# Patient Record
Sex: FEMALE | Race: WHITE | NOT HISPANIC OR LATINO | Employment: PART TIME | URBAN - METROPOLITAN AREA
[De-identification: names, ages, dates, MRNs, and addresses within clinical notes are randomized per-mention and may not be internally consistent; named-entity substitution may affect disease eponyms.]

---

## 2023-09-21 ENCOUNTER — INITIAL PRENATAL (OUTPATIENT)
Dept: OBSTETRICS AND GYNECOLOGY | Facility: CLINIC | Age: 28
End: 2023-09-21
Payer: COMMERCIAL

## 2023-09-21 VITALS — WEIGHT: 241 LBS | DIASTOLIC BLOOD PRESSURE: 62 MMHG | SYSTOLIC BLOOD PRESSURE: 124 MMHG

## 2023-09-21 DIAGNOSIS — Z3A.01 5 WEEKS GESTATION OF PREGNANCY: ICD-10-CM

## 2023-09-21 DIAGNOSIS — Z34.91 INITIAL OBSTETRIC VISIT IN FIRST TRIMESTER: Primary | ICD-10-CM

## 2023-09-21 LAB
B-HCG UR QL: POSITIVE
EXPIRATION DATE: ABNORMAL
GLUCOSE UR STRIP-MCNC: NEGATIVE MG/DL
INTERNAL NEGATIVE CONTROL: ABNORMAL
INTERNAL POSITIVE CONTROL: ABNORMAL
Lab: ABNORMAL
PROT UR STRIP-MCNC: NEGATIVE MG/DL

## 2023-09-21 RX ORDER — PRENATAL VIT/IRON FUM/FOLIC AC 27MG-0.8MG
1 TABLET ORAL DAILY
COMMUNITY

## 2023-09-22 ENCOUNTER — TELEPHONE (OUTPATIENT)
Dept: OBSTETRICS AND GYNECOLOGY | Facility: CLINIC | Age: 28
End: 2023-09-22
Payer: COMMERCIAL

## 2023-09-22 LAB
ABO GROUP BLD: NORMAL
AMPHETAMINES UR QL SCN: NEGATIVE NG/ML
BARBITURATES UR QL SCN: NEGATIVE NG/ML
BASOPHILS # BLD AUTO: 0.1 X10E3/UL (ref 0–0.2)
BASOPHILS NFR BLD AUTO: 1 %
BENZODIAZ UR QL: NEGATIVE NG/ML
BLD GP AB SCN SERPL QL: NEGATIVE
BZE UR QL: NEGATIVE NG/ML
CANNABINOIDS UR QL SCN: NEGATIVE NG/ML
EOSINOPHIL # BLD AUTO: 0.1 X10E3/UL (ref 0–0.4)
EOSINOPHIL NFR BLD AUTO: 2 %
ERYTHROCYTE [DISTWIDTH] IN BLOOD BY AUTOMATED COUNT: 11.9 % (ref 11.7–15.4)
HBV SURFACE AG SERPL QL IA: NEGATIVE
HCG INTACT+B SERPL-ACNC: 1161 MIU/ML
HCT VFR BLD AUTO: 40.7 % (ref 34–46.6)
HCV IGG SERPL QL IA: NON REACTIVE
HGB BLD-MCNC: 13.3 G/DL (ref 11.1–15.9)
HIV 1+2 AB+HIV1 P24 AG SERPL QL IA: NON REACTIVE
IMM GRANULOCYTES # BLD AUTO: 0 X10E3/UL (ref 0–0.1)
IMM GRANULOCYTES NFR BLD AUTO: 0 %
LYMPHOCYTES # BLD AUTO: 1.8 X10E3/UL (ref 0.7–3.1)
LYMPHOCYTES NFR BLD AUTO: 20 %
MCH RBC QN AUTO: 30.5 PG (ref 26.6–33)
MCHC RBC AUTO-ENTMCNC: 32.7 G/DL (ref 31.5–35.7)
MCV RBC AUTO: 93 FL (ref 79–97)
METHADONE UR QL SCN: NEGATIVE NG/ML
MONOCYTES # BLD AUTO: 0.5 X10E3/UL (ref 0.1–0.9)
MONOCYTES NFR BLD AUTO: 5 %
NEUTROPHILS # BLD AUTO: 6.5 X10E3/UL (ref 1.4–7)
NEUTROPHILS NFR BLD AUTO: 72 %
OPIATES UR QL: NEGATIVE NG/ML
PCP UR QL: NEGATIVE NG/ML
PLATELET # BLD AUTO: 416 X10E3/UL (ref 150–450)
PROPOXYPH UR QL SCN: NEGATIVE NG/ML
RBC # BLD AUTO: 4.36 X10E6/UL (ref 3.77–5.28)
RH BLD: NEGATIVE
RPR SER QL: NON REACTIVE
RUBV IGG SERPL IA-ACNC: 5.41 INDEX
VZV IGG SER IA-ACNC: 672 INDEX
WBC # BLD AUTO: 9 X10E3/UL (ref 3.4–10.8)

## 2023-09-22 NOTE — TELEPHONE ENCOUNTER
Leola    Let her know that her hormone level was 1,100.  I would like her to do another hormone level on Monday and see how it is rising.  We will go over results the following day.    Thanks    Marita

## 2023-09-22 NOTE — TELEPHONE ENCOUNTER
Spoke with Joyce to let her know that Dr Kirkland said that your hormone level was 1,100. He would like you to do another hormone level on Monday and see how it is rising. We will go over the results on the following day. 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 States she will call back to schedule blood draw.

## 2023-09-23 LAB
BACTERIA UR CULT: NORMAL
BACTERIA UR CULT: NORMAL
C TRACH RRNA SPEC QL NAA+PROBE: NEGATIVE
N GONORRHOEA RRNA SPEC QL NAA+PROBE: NEGATIVE
T VAGINALIS RRNA SPEC QL NAA+PROBE: NEGATIVE

## 2023-09-23 NOTE — PROGRESS NOTES
Cc:  First visit for new pregnancy  Patient was generally attempting pregnancy.  She has no major complaints.  No bleeding or spotting.  No significant pain issues.  Patient reports that viability is not clear but there is question regarding how far along pregnancy is.  Past medical, surgical, obstetric, genetic, social and family histories reviewed by me.  Allergies and medications reviewed.  Physical exam documented in chart.  Prenatal labs ordered including hCG level.  A/P:  IUP at approximately 5 weeks  - Serial hCG levels recommended and then timing of sonogram for viability.  - Discussed set up of our practice, labs, nutrition and vitamins, and importance of vaccinations, including Covid.  Discussed risks of Covid in pregnancy in patients who have not been vaccinated.

## 2023-09-25 ENCOUNTER — TELEPHONE (OUTPATIENT)
Dept: OBSTETRICS AND GYNECOLOGY | Facility: CLINIC | Age: 28
End: 2023-09-25

## 2023-09-25 NOTE — TELEPHONE ENCOUNTER
Lucy Álvarez is normal.  If she is bleeding, that is more worrisome.  I cannot make any assessments until I have labs back tomorrow.    Dr Kirkland

## 2023-09-25 NOTE — TELEPHONE ENCOUNTER
Patient believes around 5 weeks pregnant. Came today to check HCG level. Since she left having sharp cramping . Said feels like pain you have when bladder is really full but its not. Pt Ph [phone]

## 2023-09-26 ENCOUNTER — TELEPHONE (OUTPATIENT)
Dept: OBSTETRICS AND GYNECOLOGY | Facility: CLINIC | Age: 28
End: 2023-09-26
Payer: COMMERCIAL

## 2023-09-26 LAB — HCG INTACT+B SERPL-ACNC: 3491 MIU/ML

## 2023-09-26 NOTE — TELEPHONE ENCOUNTER
Pt called states is taking a trip that is 8 hours and will be making frequent stops. Just wanted to make sure was safe to still take this trip. Also she is scheduled for u/s on Friday and wanted to know if she still needed to come in next Wednesday for repeat HCG draw.

## 2023-09-26 NOTE — TELEPHONE ENCOUNTER
Leola    Let her know that her hormone levels are rising appropriately.    She can come to office for ultrasound for viability on Friday or Monday next week.    Thanks    Marita

## 2023-09-26 NOTE — TELEPHONE ENCOUNTER
Nereida    She can take a long trip as long as she takes a break and gets out of the car every 2 to 3 hours.    She only needs to do sonogram at this point.    Thanks    Marita

## 2023-10-03 LAB
CITATION REF LAB TEST: NORMAL
GENDER IDENTITY: NORMAL
GENE DIS ANL CARRIER INTERP-IMP: NORMAL
GENE STUDIED ID: NORMAL
GENETIC SCN SPEC: NORMAL
LAB DIRECTOR NAME PROVIDER: NORMAL
Lab: NORMAL
REASON FOR REFERRAL (NARRATIVE): NORMAL
RECOMMENDATION PATIENT DOC-IMP: NORMAL
REF LAB TEST METHOD: NORMAL
SERVICE CMNT-IMP: NORMAL
SPECIMEN SOURCE: NORMAL

## 2023-10-09 ENCOUNTER — ROUTINE PRENATAL (OUTPATIENT)
Dept: OBSTETRICS AND GYNECOLOGY | Facility: CLINIC | Age: 28
End: 2023-10-09
Payer: COMMERCIAL

## 2023-10-09 VITALS — DIASTOLIC BLOOD PRESSURE: 72 MMHG | WEIGHT: 236 LBS | SYSTOLIC BLOOD PRESSURE: 106 MMHG

## 2023-10-09 DIAGNOSIS — Z3A.01 7 WEEKS GESTATION OF PREGNANCY: Primary | ICD-10-CM

## 2023-10-09 DIAGNOSIS — Z34.01 PRIMIGRAVIDA, FIRST TRIMESTER: ICD-10-CM

## 2023-10-09 DIAGNOSIS — O21.9 NAUSEA AND VOMITING IN PREGNANCY: ICD-10-CM

## 2023-10-09 LAB
EXPIRATION DATE: NORMAL
GLUCOSE UR STRIP-MCNC: NEGATIVE MG/DL
Lab: NORMAL
PROT UR STRIP-MCNC: NEGATIVE MG/DL

## 2023-10-09 RX ORDER — VITAMIN A ACETATE, BETA CAROTENE, ASCORBIC ACID, CHOLECALCIFEROL, .ALPHA.-TOCOPHEROL ACETATE, DL-, THIAMINE MONONITRATE, RIBOFLAVIN, NIACINAMIDE, PYRIDOXINE HYDROCHLORIDE, FOLIC ACID, CYANOCOBALAMIN, CALCIUM CARBONATE, FERROUS FUMARATE, ZINC OXIDE, CUPRIC OXIDE 3080; 12; 120; 400; 1; 1.84; 3; 20; 22; 920; 25; 200; 27; 10; 2 [IU]/1; UG/1; MG/1; [IU]/1; MG/1; MG/1; MG/1; MG/1; MG/1; [IU]/1; MG/1; MG/1; MG/1; MG/1; MG/1
1 TABLET, FILM COATED ORAL DAILY
COMMUNITY

## 2023-10-09 RX ORDER — PROMETHAZINE HYDROCHLORIDE 12.5 MG/1
12.5 TABLET ORAL EVERY 6 HOURS PRN
Qty: 30 TABLET | Refills: 1 | Status: SHIPPED | OUTPATIENT
Start: 2023-10-09

## 2023-10-09 RX ORDER — DOCUSATE SODIUM 100 MG/1
CAPSULE, LIQUID FILLED ORAL
COMMUNITY
Start: 2023-06-16

## 2023-10-09 NOTE — PROGRESS NOTES
Cc:  Pregnancy follow up.  Patient c/o not being able to keep most solids/liquids down. She is able to keep water down if she drinks very slow.  She has tried several OTC remedies including randell and Unisom/B complex.  No bleeding or spotting.  Vitals reviewed by me.  Ultrasound and labs reviewed with patient.  A/P:  IUP at 7 weeks with nausea in pregnancy.  - Start Promethazine next.  - Discussed maternal well being and normal symptoms of first trimester.

## 2023-10-11 ENCOUNTER — HOSPITAL ENCOUNTER (EMERGENCY)
Facility: HOSPITAL | Age: 28
Discharge: HOME | End: 2023-10-11
Attending: EMERGENCY MEDICINE | Admitting: EMERGENCY MEDICINE
Payer: COMMERCIAL

## 2023-10-11 ENCOUNTER — TELEPHONE (OUTPATIENT)
Dept: OBSTETRICS AND GYNECOLOGY | Facility: CLINIC | Age: 28
End: 2023-10-11

## 2023-10-11 VITALS
SYSTOLIC BLOOD PRESSURE: 111 MMHG | TEMPERATURE: 98.3 F | HEART RATE: 99 BPM | DIASTOLIC BLOOD PRESSURE: 70 MMHG | RESPIRATION RATE: 16 BRPM | HEIGHT: 62 IN | OXYGEN SATURATION: 98 % | BODY MASS INDEX: 43.24 KG/M2 | WEIGHT: 235 LBS

## 2023-10-11 DIAGNOSIS — O21.9 NAUSEA AND VOMITING DURING PREGNANCY: Primary | ICD-10-CM

## 2023-10-11 LAB
ALBUMIN SERPL-MCNC: 3.8 G/DL (ref 3.5–5.2)
ALBUMIN/GLOB SERPL: 1.2 G/DL
ALP SERPL-CCNC: 85 U/L (ref 39–117)
ALT SERPL W P-5'-P-CCNC: 14 U/L (ref 1–33)
ANION GAP SERPL CALCULATED.3IONS-SCNC: 11 MMOL/L (ref 5–15)
AST SERPL-CCNC: 14 U/L (ref 1–32)
BACTERIA UR QL AUTO: ABNORMAL /HPF
BASOPHILS # BLD AUTO: 0.04 10*3/MM3 (ref 0–0.2)
BASOPHILS NFR BLD AUTO: 0.4 % (ref 0–1.5)
BILIRUB SERPL-MCNC: 0.3 MG/DL (ref 0–1.2)
BILIRUB UR QL STRIP: NEGATIVE
BUN SERPL-MCNC: 7 MG/DL (ref 6–20)
BUN/CREAT SERPL: 10.3 (ref 7–25)
CALCIUM SPEC-SCNC: 9.2 MG/DL (ref 8.6–10.5)
CHLORIDE SERPL-SCNC: 106 MMOL/L (ref 98–107)
CLARITY UR: ABNORMAL
CO2 SERPL-SCNC: 22 MMOL/L (ref 22–29)
COLOR UR: YELLOW
CREAT SERPL-MCNC: 0.68 MG/DL (ref 0.57–1)
DEPRECATED RDW RBC AUTO: 38.1 FL (ref 37–54)
EGFRCR SERPLBLD CKD-EPI 2021: 123.4 ML/MIN/1.73
EOSINOPHIL # BLD AUTO: 0.12 10*3/MM3 (ref 0–0.4)
EOSINOPHIL NFR BLD AUTO: 1.3 % (ref 0.3–6.2)
ERYTHROCYTE [DISTWIDTH] IN BLOOD BY AUTOMATED COUNT: 11.7 % (ref 12.3–15.4)
GLOBULIN UR ELPH-MCNC: 3.3 GM/DL
GLUCOSE BLDC GLUCOMTR-MCNC: 95 MG/DL (ref 70–130)
GLUCOSE SERPL-MCNC: 81 MG/DL (ref 65–99)
GLUCOSE UR STRIP-MCNC: NEGATIVE MG/DL
HCT VFR BLD AUTO: 38.4 % (ref 34–46.6)
HGB BLD-MCNC: 13.1 G/DL (ref 12–15.9)
HGB UR QL STRIP.AUTO: NEGATIVE
HYALINE CASTS UR QL AUTO: ABNORMAL /LPF
IMM GRANULOCYTES # BLD AUTO: 0.05 10*3/MM3 (ref 0–0.05)
IMM GRANULOCYTES NFR BLD AUTO: 0.5 % (ref 0–0.5)
KETONES UR QL STRIP: NEGATIVE
LEUKOCYTE ESTERASE UR QL STRIP.AUTO: ABNORMAL
LYMPHOCYTES # BLD AUTO: 2.03 10*3/MM3 (ref 0.7–3.1)
LYMPHOCYTES NFR BLD AUTO: 21.4 % (ref 19.6–45.3)
MAGNESIUM SERPL-MCNC: 2 MG/DL (ref 1.6–2.6)
MCH RBC QN AUTO: 30.8 PG (ref 26.6–33)
MCHC RBC AUTO-ENTMCNC: 34.1 G/DL (ref 31.5–35.7)
MCV RBC AUTO: 90.4 FL (ref 79–97)
MONOCYTES # BLD AUTO: 0.64 10*3/MM3 (ref 0.1–0.9)
MONOCYTES NFR BLD AUTO: 6.8 % (ref 5–12)
NEUTROPHILS NFR BLD AUTO: 6.59 10*3/MM3 (ref 1.7–7)
NEUTROPHILS NFR BLD AUTO: 69.6 % (ref 42.7–76)
NITRITE UR QL STRIP: NEGATIVE
NRBC BLD AUTO-RTO: 0 /100 WBC (ref 0–0.2)
PH UR STRIP.AUTO: 8 [PH] (ref 5–8)
PLATELET # BLD AUTO: 393 10*3/MM3 (ref 140–450)
PMV BLD AUTO: 10.6 FL (ref 6–12)
POTASSIUM SERPL-SCNC: 3.9 MMOL/L (ref 3.5–5.2)
PROT SERPL-MCNC: 7.1 G/DL (ref 6–8.5)
PROT UR QL STRIP: NEGATIVE
RBC # BLD AUTO: 4.25 10*6/MM3 (ref 3.77–5.28)
RBC # UR STRIP: ABNORMAL /HPF
REF LAB TEST METHOD: ABNORMAL
SODIUM SERPL-SCNC: 139 MMOL/L (ref 136–145)
SP GR UR STRIP: 1.02 (ref 1–1.03)
SQUAMOUS #/AREA URNS HPF: ABNORMAL /HPF
UROBILINOGEN UR QL STRIP: ABNORMAL
WBC # UR STRIP: ABNORMAL /HPF
WBC NRBC COR # BLD: 9.47 10*3/MM3 (ref 3.4–10.8)

## 2023-10-11 PROCEDURE — 99283 EMERGENCY DEPT VISIT LOW MDM: CPT

## 2023-10-11 PROCEDURE — 85025 COMPLETE CBC W/AUTO DIFF WBC: CPT | Performed by: EMERGENCY MEDICINE

## 2023-10-11 PROCEDURE — 81001 URINALYSIS AUTO W/SCOPE: CPT | Performed by: EMERGENCY MEDICINE

## 2023-10-11 PROCEDURE — 80053 COMPREHEN METABOLIC PANEL: CPT | Performed by: EMERGENCY MEDICINE

## 2023-10-11 PROCEDURE — 83735 ASSAY OF MAGNESIUM: CPT | Performed by: EMERGENCY MEDICINE

## 2023-10-11 PROCEDURE — 25810000003 DEXTROSE-NACL PER 500 ML: Performed by: EMERGENCY MEDICINE

## 2023-10-11 PROCEDURE — 96374 THER/PROPH/DIAG INJ IV PUSH: CPT

## 2023-10-11 PROCEDURE — 82948 REAGENT STRIP/BLOOD GLUCOSE: CPT

## 2023-10-11 PROCEDURE — 96361 HYDRATE IV INFUSION ADD-ON: CPT

## 2023-10-11 PROCEDURE — 25010000002 ONDANSETRON PER 1 MG: Performed by: EMERGENCY MEDICINE

## 2023-10-11 RX ORDER — ONDANSETRON 4 MG/1
4 TABLET, ORALLY DISINTEGRATING ORAL 4 TIMES DAILY PRN
Qty: 12 TABLET | Refills: 0 | Status: SHIPPED | OUTPATIENT
Start: 2023-10-11

## 2023-10-11 RX ORDER — SODIUM CHLORIDE 0.9 % (FLUSH) 0.9 %
10 SYRINGE (ML) INJECTION AS NEEDED
Status: DISCONTINUED | OUTPATIENT
Start: 2023-10-11 | End: 2023-10-11 | Stop reason: HOSPADM

## 2023-10-11 RX ORDER — DEXTROSE AND SODIUM CHLORIDE 5; .9 G/100ML; G/100ML
500 INJECTION, SOLUTION INTRAVENOUS CONTINUOUS
Status: DISCONTINUED | OUTPATIENT
Start: 2023-10-11 | End: 2023-10-11 | Stop reason: HOSPADM

## 2023-10-11 RX ORDER — ONDANSETRON 2 MG/ML
4 INJECTION INTRAMUSCULAR; INTRAVENOUS ONCE
Status: COMPLETED | OUTPATIENT
Start: 2023-10-11 | End: 2023-10-11

## 2023-10-11 RX ADMIN — ONDANSETRON 4 MG: 2 INJECTION INTRAMUSCULAR; INTRAVENOUS at 11:39

## 2023-10-11 RX ADMIN — DEXTROSE AND SODIUM CHLORIDE 500 ML/HR: 5; 900 INJECTION, SOLUTION INTRAVENOUS at 11:43

## 2023-10-11 NOTE — DISCHARGE INSTRUCTIONS
Take nausea medicine as prescribed.  Call your OB doctor for an appointment to be seen for follow-up.  Return here meetly for any vaginal bleeding or worsened pain.  Return here immediately if you are not able to keep any fluids down.

## 2023-10-11 NOTE — ED PROVIDER NOTES
EMERGENCY DEPARTMENT ENCOUNTER    Room Number:  14/14  PCP: Provider, No Known  Patient Care Team:  Provider, No Known as PCP - General   Independent Historians: Patient    HPI:  Chief Complaint: Nausea and vomiting in pregnancy    A complete HPI/ROS/PMH/PSH/SH/FH are unobtainable due to: Nothing    Chronic or social conditions impacting patient care (Social Determinants of Health): None  (Financial Resource Strain / Food Insecurity / Transportation Needs / Physical Activity / Stress / Social Connections / Intimate Partner Violence / Housing Stability)    Context: Joyce Carrillo is a 28 y.o. female who presents to the ED c/o acute nausea and vomiting in pregnancy.  The patient reports that she is almost 3 weeks pregnant.  She reports this is her first pregnancy.  She reports she has had persistent nausea and vomiting throughout pregnancy.  She states she saw her OB doctor 2 days ago and they started her on Phenergan.  She states that 20 minutes after taking Phenergan she vomits and cannot keep anything down.  She states that prior to the Phenergan she had used over-the-counter remedies without improvement.  She denies any vaginal bleeding.  She reports she has had cramping throughout the pregnancy which is unchanged and she let her OB doctor know about that on Monday.  She called the office this morning and was directed to the emergency room for further evaluation.    Review of prior external notes (non-ED) -and- Review of prior external test results outside of this encounter: OB note dated 10/9/2023 notes vomiting and to start promethazine.     Prescription drug monitoring program review:         PAST MEDICAL HISTORY  Active Ambulatory Problems     Diagnosis Date Noted    No Active Ambulatory Problems     Resolved Ambulatory Problems     Diagnosis Date Noted    No Resolved Ambulatory Problems     No Additional Past Medical History         PAST SURGICAL HISTORY  No past surgical history on file.      FAMILY  HISTORY  No family history on file.      SOCIAL HISTORY  Social History     Socioeconomic History    Marital status:    Substance and Sexual Activity    Sexual activity: Yes     Partners: Male     Birth control/protection: None         ALLERGIES  Wellbutrin [bupropion] and Celecoxib        REVIEW OF SYSTEMS  Review of Systems  Included in HPI  All systems reviewed and negative except for those discussed in HPI.      PHYSICAL EXAM    I have reviewed the triage vital signs and nursing notes.    ED Triage Vitals   Temp Heart Rate Resp BP SpO2   10/11/23 1046 10/11/23 1046 10/11/23 1048 10/11/23 1048 10/11/23 1046   98.3 °F (36.8 °C) 99 16 111/70 98 %      Temp src Heart Rate Source Patient Position BP Location FiO2 (%)   -- -- -- -- --              Physical Exam  GENERAL: Awake, alert, no acute distress  SKIN: Warm, dry  HENT: Normocephalic, atraumatic  EYES: no scleral icterus  CV: regular rhythm, regular rate  RESPIRATORY: normal effort, lungs clear  ABDOMEN: soft, nontender, nondistended  MUSCULOSKELETAL: no deformity  NEURO: alert, moves all extremities, follows commands                                                               LAB RESULTS  Recent Results (from the past 24 hour(s))   Urinalysis With Microscopic If Indicated (No Culture) - Urine, Clean Catch    Collection Time: 10/11/23 11:30 AM    Specimen: Urine, Clean Catch   Result Value Ref Range    Color, UA Yellow Yellow, Straw    Appearance, UA Turbid (A) Clear    pH, UA 8.0 5.0 - 8.0    Specific Gravity, UA 1.021 1.005 - 1.030    Glucose, UA Negative Negative    Ketones, UA Negative Negative    Bilirubin, UA Negative Negative    Blood, UA Negative Negative    Protein, UA Negative Negative    Leuk Esterase, UA Trace (A) Negative    Nitrite, UA Negative Negative    Urobilinogen, UA 1.0 E.U./dL 0.2 - 1.0 E.U./dL   POC Glucose Once    Collection Time: 10/11/23 11:31 AM    Specimen: Blood   Result Value Ref Range    Glucose 95 70 - 130 mg/dL    Comprehensive Metabolic Panel    Collection Time: 10/11/23 11:38 AM    Specimen: Blood   Result Value Ref Range    Glucose 81 65 - 99 mg/dL    BUN 7 6 - 20 mg/dL    Creatinine 0.68 0.57 - 1.00 mg/dL    Sodium 139 136 - 145 mmol/L    Potassium 3.9 3.5 - 5.2 mmol/L    Chloride 106 98 - 107 mmol/L    CO2 22.0 22.0 - 29.0 mmol/L    Calcium 9.2 8.6 - 10.5 mg/dL    Total Protein 7.1 6.0 - 8.5 g/dL    Albumin 3.8 3.5 - 5.2 g/dL    ALT (SGPT) 14 1 - 33 U/L    AST (SGOT) 14 1 - 32 U/L    Alkaline Phosphatase 85 39 - 117 U/L    Total Bilirubin 0.3 0.0 - 1.2 mg/dL    Globulin 3.3 gm/dL    A/G Ratio 1.2 g/dL    BUN/Creatinine Ratio 10.3 7.0 - 25.0    Anion Gap 11.0 5.0 - 15.0 mmol/L    eGFR 123.4 >60.0 mL/min/1.73   CBC Auto Differential    Collection Time: 10/11/23 11:38 AM    Specimen: Blood   Result Value Ref Range    WBC 9.47 3.40 - 10.80 10*3/mm3    RBC 4.25 3.77 - 5.28 10*6/mm3    Hemoglobin 13.1 12.0 - 15.9 g/dL    Hematocrit 38.4 34.0 - 46.6 %    MCV 90.4 79.0 - 97.0 fL    MCH 30.8 26.6 - 33.0 pg    MCHC 34.1 31.5 - 35.7 g/dL    RDW 11.7 (L) 12.3 - 15.4 %    RDW-SD 38.1 37.0 - 54.0 fl    MPV 10.6 6.0 - 12.0 fL    Platelets 393 140 - 450 10*3/mm3    Neutrophil % 69.6 42.7 - 76.0 %    Lymphocyte % 21.4 19.6 - 45.3 %    Monocyte % 6.8 5.0 - 12.0 %    Eosinophil % 1.3 0.3 - 6.2 %    Basophil % 0.4 0.0 - 1.5 %    Immature Grans % 0.5 0.0 - 0.5 %    Neutrophils, Absolute 6.59 1.70 - 7.00 10*3/mm3    Lymphocytes, Absolute 2.03 0.70 - 3.10 10*3/mm3    Monocytes, Absolute 0.64 0.10 - 0.90 10*3/mm3    Eosinophils, Absolute 0.12 0.00 - 0.40 10*3/mm3    Basophils, Absolute 0.04 0.00 - 0.20 10*3/mm3    Immature Grans, Absolute 0.05 0.00 - 0.05 10*3/mm3    nRBC 0.0 0.0 - 0.2 /100 WBC   Magnesium    Collection Time: 10/11/23 11:38 AM    Specimen: Blood   Result Value Ref Range    Magnesium 2.0 1.6 - 2.6 mg/dL       Ordered the above labs and independently reviewed the results.        RADIOLOGY  No Radiology Exams Resulted Within  Past 24 Hours    I ordered the above noted radiological studies. Reviewed by me and discussed with radiologist.  See dictation for official radiology interpretation.      PROCEDURES    Procedures      MEDICATIONS GIVEN IN ER    Medications   sodium chloride 0.9 % flush 10 mL (has no administration in time range)   dextrose 5 % and sodium chloride 0.9 % infusion (500 mL/hr Intravenous New Bag 10/11/23 1143)   ondansetron (ZOFRAN) injection 4 mg (4 mg Intravenous Given 10/11/23 1139)         ORDERS PLACED DURING THIS VISIT:  Orders Placed This Encounter   Procedures    Comprehensive Metabolic Panel    Urinalysis With Microscopic If Indicated (No Culture) - Urine, Clean Catch    CBC Auto Differential    Magnesium    Urinalysis, Microscopic Only - Urine, Clean Catch    Monitor Blood Pressure    Pulse Oximetry, Continuous    POC Glucose STAT    POC Glucose Once    Insert Peripheral IV    CBC & Differential         PROGRESS, DATA ANALYSIS, CONSULTS, AND MEDICAL DECISION MAKING    All labs have been independently interpreted by me.  All radiology studies have been reviewed by me and discussed with radiologist dictating the report.   EKG's independently viewed and interpreted by me.  Discussion below represents my analysis of pertinent findings related to patient's condition, differential diagnosis, treatment plan and final disposition.    Differential diagnosis includes but is not limited to hyperemesi gravidarum, nausea vomiting, renal failure, dehydration, hypokalemia.    ED Course as of 10/11/23 1221   Wed Oct 11, 2023   1158 Ketones, UA: Negative [TR]   1220 Specific Gravity, UA: 1.021 [TR]   1220 I reviewed the work-up and findings with the patient at the bedside.  Answered all questions.  She reports she feels significantly better after Zofran.  I discussed risks and benefits of Zofran in the first trimester and the patient wants to proceed with Zofran.  She states she actually asked Dr. Kirkland for Zofran but was  directed towards Phenergan as first-line.  She reports she is much more comfortable with Zofran.  Her urinalysis shows no ketones and no urine concentration.  Plan discharge home with outpatient OB follow-up.  She is agreeable.  She has no signs of miscarriage. [TR]      ED Course User Index  [TR] Haim Swain MD           AS OF 12:21 EDT VITALS:    BP - 111/70  HR - 99  TEMP - 98.3 °F (36.8 °C)  O2 SATS - 98%        DIAGNOSIS  Final diagnoses:   Nausea and vomiting during pregnancy         DISPOSITION  ED Disposition       ED Disposition   Discharge    Condition   Stable    Comment   --                  Note Disclaimer: At [facility], we believe that sharing information builds trust and better relationships. You are receiving this note because you recently visited [facility]. It is possible you will see health information before a provider has talked with you about it. This kind of information can be easy to misunderstand. To help you fully understand what it means for your health, we urge you to discuss this note with your provider.         Haim Swain MD  10/11/23 2273

## 2023-12-13 ENCOUNTER — TELEPHONE (OUTPATIENT)
Dept: OBSTETRICS AND GYNECOLOGY | Facility: CLINIC | Age: 28
End: 2023-12-13

## 2023-12-13 NOTE — TELEPHONE ENCOUNTER
WANDA call. NATHALIE. She transferred her care to [facility] due to her insurance. Thank you

## 2023-12-13 NOTE — TELEPHONE ENCOUNTER
PT CALLING TO INFORM OFFICE, SHE HAS TRANSFERRED HER OB CARE TO [facility] DUE TO INSURANCE, FYI, THANK YOU